# Patient Record
Sex: FEMALE | Race: WHITE | Employment: OTHER | URBAN - METROPOLITAN AREA
[De-identification: names, ages, dates, MRNs, and addresses within clinical notes are randomized per-mention and may not be internally consistent; named-entity substitution may affect disease eponyms.]

---

## 2020-10-18 ENCOUNTER — HOSPITAL ENCOUNTER (EMERGENCY)
Age: 65
Discharge: HOME | End: 2020-10-18
Attending: EMERGENCY MEDICINE
Payer: MEDICARE

## 2020-10-18 VITALS
HEIGHT: 66 IN | SYSTOLIC BLOOD PRESSURE: 141 MMHG | HEART RATE: 80 BPM | TEMPERATURE: 97.7 F | BODY MASS INDEX: 27.97 KG/M2 | WEIGHT: 174 LBS | RESPIRATION RATE: 18 BRPM | DIASTOLIC BLOOD PRESSURE: 94 MMHG | OXYGEN SATURATION: 95 %

## 2020-10-18 DIAGNOSIS — S61.210A LACERATION OF RIGHT INDEX FINGER WITHOUT FOREIGN BODY WITHOUT DAMAGE TO NAIL, INITIAL ENCOUNTER: Primary | ICD-10-CM

## 2020-10-18 PROCEDURE — 74011250636 HC RX REV CODE- 250/636: Performed by: PHYSICIAN ASSISTANT

## 2020-10-18 PROCEDURE — 75810000293 HC SIMP/SUPERF WND  RPR

## 2020-10-18 PROCEDURE — 90471 IMMUNIZATION ADMIN: CPT

## 2020-10-18 PROCEDURE — 74011000250 HC RX REV CODE- 250: Performed by: PHYSICIAN ASSISTANT

## 2020-10-18 PROCEDURE — 99283 EMERGENCY DEPT VISIT LOW MDM: CPT

## 2020-10-18 PROCEDURE — 90715 TDAP VACCINE 7 YRS/> IM: CPT | Performed by: PHYSICIAN ASSISTANT

## 2020-10-18 RX ORDER — BUPIVACAINE HYDROCHLORIDE 5 MG/ML
10 INJECTION, SOLUTION EPIDURAL; INTRACAUDAL
Status: COMPLETED | OUTPATIENT
Start: 2020-10-18 | End: 2020-10-18

## 2020-10-18 RX ORDER — BACITRACIN 500 UNIT/G
1 PACKET (EA) TOPICAL
Status: COMPLETED | OUTPATIENT
Start: 2020-10-18 | End: 2020-10-18

## 2020-10-18 RX ADMIN — TETANUS TOXOID, REDUCED DIPHTHERIA TOXOID AND ACELLULAR PERTUSSIS VACCINE, ADSORBED 0.5 ML: 5; 2.5; 8; 8; 2.5 SUSPENSION INTRAMUSCULAR at 22:53

## 2020-10-18 RX ADMIN — BACITRACIN 1 PACKET: 500 OINTMENT TOPICAL at 22:53

## 2020-10-18 RX ADMIN — BUPIVACAINE HYDROCHLORIDE 50 MG: 5 INJECTION, SOLUTION EPIDURAL; INTRACAUDAL at 22:53

## 2020-10-19 NOTE — ED TRIAGE NOTES
Pt presents to ED with cc of laceration to right 2nd finger from cutting a lemon. Pt has paper towel to finger to control bleeding. Pt is on anticoagulants.  Unknown last TDAP

## 2020-10-19 NOTE — DISCHARGE INSTRUCTIONS
Patient Education     If still having any numbness in 1-2 days, call and make a follow up appointment with the hand surgeon, Dr. Zaire No. If it completely resolves, you do not need to see the hand specialist.    Keep the wound dry for the first 24-48 hours. After that it is okay for the wound to get wet, but do not soak it for extended periods of time. Wash the wound 1-2 times a day with warm water and gentle soap. Apply an antibiotic ointment such as Neosporin or Bacitracin, or plain petroleum jelly (Vaseline). Keep covered until healed if able to. Watch for any signs of infection, including fever/chills, redness, pain, swelling, or drainage from the wound, and seek medical attention if necessary. See your primary care or return to the ED in 12-14 days to have the sutures removed. Cuts on the Hand Closed With Stitches: Care Instructions  Your Care Instructions     A cut on your hand can be on your fingers, your thumb, or the front or back of your hand. Sometimes a cut can injure the tendons, blood vessels, or nerves of your hand. The doctor used stitches to close the cut. Using stitches also helps the cut heal and reduces scarring. The doctor may have given you a splint to help prevent you from moving your hand, fingers, or thumb. If the cut went deep and through the skin, the doctor put in two layers of stitches. The deeper layer brings the deep part of the cut together. These stitches will dissolve and don't need to be removed. The stitches in the upper layer are the ones you see on the cut. You will probably have a bandage. You will need to have the stitches removed, usually in 7 to 14 days. The doctor may suggest that you see a hand specialist if the cut is very deep or if you have trouble moving your fingers or have less feeling in your hand. The doctor has checked you carefully, but problems can develop later.  If you notice any problems or new symptoms, get medical treatment right away.  Follow-up care is a key part of your treatment and safety. Be sure to make and go to all appointments, and call your doctor if you are having problems. It's also a good idea to know your test results and keep a list of the medicines you take. How can you care for yourself at home? · Keep the cut dry for the first 24 to 48 hours. After this, you can shower if your doctor okays it. Pat the cut dry. · Don't soak the cut, such as in a bathtub. Your doctor will tell you when it's safe to get the cut wet. · If your doctor told you how to care for your cut, follow your doctor's instructions. If you did not get instructions, follow this general advice:  ? After the first 24 to 48 hours, wash around the cut with clean water 2 times a day. Don't use hydrogen peroxide or alcohol, which can slow healing. ? You may cover the cut with a thin layer of petroleum jelly, such as Vaseline, and a nonstick bandage. ? Apply more petroleum jelly and replace the bandage as needed. · Prop up the sore hand on a pillow anytime you sit or lie down during the next 3 days. Try to keep it above the level of your heart. This will help reduce swelling. · Avoid any activity that could cause your cut to reopen. · Do not remove the stitches on your own. Your doctor will tell you when to come back to have the stitches removed. · Be safe with medicines. Take pain medicines exactly as directed. ? If the doctor gave you a prescription medicine for pain, take it as prescribed. ? If you are not taking a prescription pain medicine, ask your doctor if you can take an over-the-counter medicine. When should you call for help? Call your doctor now or seek immediate medical care if:    · You have new pain, or your pain gets worse.     · The skin near the cut is cold or pale or changes color.     · You have tingling, weakness, or numbness near the cut.     · The cut starts to bleed, and blood soaks through the bandage.  Oozing small amounts of blood is normal.     · You have trouble moving the area of the hand near the cut.     · You have symptoms of infection, such as:  ? Increased pain, swelling, warmth, or redness around the cut.  ? Red streaks leading from the cut.  ? Pus draining from the cut.  ? A fever. Watch closely for changes in your health, and be sure to contact your doctor if:    · You do not get better as expected. Where can you learn more? Go to http://www.gray.com/  Enter T250 in the search box to learn more about \"Cuts on the Hand Closed With Stitches: Care Instructions. \"  Current as of: June 26, 2019               Content Version: 12.6  © 9942-7306 [employer]. Care instructions adapted under license by [employer] (which disclaims liability or warranty for this information). If you have questions about a medical condition or this instruction, always ask your healthcare professional. [address] any warranty or liability for your use of this information.

## 2020-10-19 NOTE — ED PROVIDER NOTES
28-year-old right-handed female presenting to the ED for right finger laceration. Patient notes that just prior to arrival she was trying to peel a lemon that she was holding in her hand with a knife when it slipped, causing her to lacerate the volar surface of her right second finger over the proximal phalanx. Moderately severe pain, worse with movement. Notes that she had numbness that has vastly improved, states that it does still feel slightly different than her other fingers. Normal range of motion. Unsure of tetanus. No treatment prior to arrival.  No other concerns. Past medical history: Questionable retinal artery or retinal vein occlusion, patient on Plavix for something that happened with her eye. Also takes medication for hypothyroid and high cholesterol           No past medical history on file. No past surgical history on file. No family history on file.     Social History     Socioeconomic History    Marital status:      Spouse name: Not on file    Number of children: Not on file    Years of education: Not on file    Highest education level: Not on file   Occupational History    Not on file   Social Needs    Financial resource strain: Not on file    Food insecurity     Worry: Not on file     Inability: Not on file    Transportation needs     Medical: Not on file     Non-medical: Not on file   Tobacco Use    Smoking status: Not on file   Substance and Sexual Activity    Alcohol use: Not on file    Drug use: Not on file    Sexual activity: Not on file   Lifestyle    Physical activity     Days per week: Not on file     Minutes per session: Not on file    Stress: Not on file   Relationships    Social connections     Talks on phone: Not on file     Gets together: Not on file     Attends Sabianist service: Not on file     Active member of club or organization: Not on file     Attends meetings of clubs or organizations: Not on file     Relationship status: Not on file  Intimate partner violence     Fear of current or ex partner: Not on file     Emotionally abused: Not on file     Physically abused: Not on file     Forced sexual activity: Not on file   Other Topics Concern    Not on file   Social History Narrative    Not on file         ALLERGIES: Patient has no known allergies. Review of Systems   Constitutional: Negative for fever. HENT: Negative for facial swelling. Respiratory: Negative for shortness of breath. Cardiovascular: Negative for chest pain. Gastrointestinal: Negative for vomiting. Skin: Positive for wound. Neurological: Positive for numbness. Negative for syncope. All other systems reviewed and are negative. Vitals:    10/18/20 2231   BP: (!) 141/94   Pulse: 80   Resp: 18   Temp: 97.7 °F (36.5 °C)   SpO2: 95%   Weight: 78.9 kg (174 lb)   Height: 5' 6\" (1.676 m)            Physical Exam  Vitals signs and nursing note reviewed. Constitutional:       Appearance: She is well-developed. Comments: [facility] female   HENT:      Head: Normocephalic. Eyes:      Conjunctiva/sclera: Conjunctivae normal.   Neck:      Musculoskeletal: Neck supple. Cardiovascular:      Rate and Rhythm: Normal rate. Pulmonary:      Effort: Pulmonary effort is normal. No respiratory distress. Musculoskeletal: Normal range of motion. Comments: Right second finger: 3 cm curvilinear laceration noted over the volar surface of the proximal phalanx. Patient able to fully flex and extend the finger. Does report a subjective decrease in sensation over the radial aspect of the finger distal to the wound. Skin:     General: Skin is warm and dry. Neurological:      Mental Status: She is alert and oriented to person, place, and time. MDM  Number of Diagnoses or Management Options  Diagnosis management comments: D11year-old female presenting to the ED for finger laceration sustained while trying to peel a lemon that she was holding. Will update tetanus, does report some subjective decrease in sensation so will provide orthopedic hand surgery for follow-up. Normal range of motion, after wound is anesthetized will explore to evaluate for possible tendon injury. Wound repaired, no evidence of tendon injury on exam.  Given the patient had reported some decreased sensation over the ulnar aspect of the second finger of her dominant hand, encouraged her to follow-up with a hand surgeon if that was still present in a day or 2. Care instructions given. Amount and/or Complexity of Data Reviewed  Discuss the patient with other providers: yes (Dr. Elder Worrell ED attending)           Nerve Block    Date/Time: 10/18/2020 11:19 PM  Performed by: NELLA Ballesteros  Authorized by: NELLA Ballesteros     Consent:     Consent obtained:  Verbal    Consent given by:  Patient  Indications:     Indications:  Pain relief and procedural anesthesia  Location:     Laterality:  Right (2nd finger)  Pre-procedure details:     Skin preparation:  Alcohol  Procedure details (see MAR for exact dosages): Block needle gauge:  25 G    Anesthetic injected:  Bupivacaine 0.5% w/o epi    Additive injected:  None    Injection procedure:  Anatomic landmarks identified    Paresthesia:  None    Wound Repair    Date/Time: 10/18/2020 11:42 PM  Performed by: [facility] provider: Dr. Elder Worrell  Preparation: skin prepped with Shur-Clens  Location: right 2nd finger. Wound length:2.6 - 7.5 cm (3)  Anesthesia: digital block  Foreign bodies: no foreign bodies  Irrigation solution: tap water and saline  Irrigation method: syringe and tap  Debridement: none  Skin closure: 5-0 nylon  Number of sutures: 5  Technique: simple and interrupted  Approximation: close  Patient tolerance: Patient tolerated the procedure well with no immediate complications  My total time at bedside, performing this procedure was 16-30 minutes.   Comments: After digital block wound placed under the tap for at least 5 minutes, copiously irrigated.   Tourniquet applied for no more than 3 minutes, wound explored through full range of motion with hemostasis achieved, no evidence of foreign body or tendon laceration

## 2020-10-19 NOTE — ED NOTES
Discharge instructions given to patient by Ramesh Roper RN. Pt has given counseling on discharge instructions and verbalizes understanding. Pt ambulated off unit in no signs of distress.